# Patient Record
Sex: FEMALE | Race: WHITE | NOT HISPANIC OR LATINO | URBAN - METROPOLITAN AREA
[De-identification: names, ages, dates, MRNs, and addresses within clinical notes are randomized per-mention and may not be internally consistent; named-entity substitution may affect disease eponyms.]

---

## 2017-08-01 ENCOUNTER — EMERGENCY (EMERGENCY)
Facility: HOSPITAL | Age: 67
LOS: 0 days | Discharge: HOME | End: 2017-08-01

## 2017-08-01 DIAGNOSIS — N39.0 URINARY TRACT INFECTION, SITE NOT SPECIFIED: ICD-10-CM

## 2017-08-01 DIAGNOSIS — I10 ESSENTIAL (PRIMARY) HYPERTENSION: ICD-10-CM

## 2017-08-01 DIAGNOSIS — M79.89 OTHER SPECIFIED SOFT TISSUE DISORDERS: ICD-10-CM

## 2017-08-01 DIAGNOSIS — R35.0 FREQUENCY OF MICTURITION: ICD-10-CM

## 2017-08-01 DIAGNOSIS — Z79.899 OTHER LONG TERM (CURRENT) DRUG THERAPY: ICD-10-CM

## 2017-08-31 ENCOUNTER — EMERGENCY (EMERGENCY)
Facility: HOSPITAL | Age: 67
LOS: 0 days | Discharge: HOME | End: 2017-08-31

## 2017-08-31 DIAGNOSIS — L53.9 ERYTHEMATOUS CONDITION, UNSPECIFIED: ICD-10-CM

## 2017-08-31 DIAGNOSIS — I10 ESSENTIAL (PRIMARY) HYPERTENSION: ICD-10-CM

## 2017-08-31 DIAGNOSIS — Z79.899 OTHER LONG TERM (CURRENT) DRUG THERAPY: ICD-10-CM

## 2017-08-31 DIAGNOSIS — E66.01 MORBID (SEVERE) OBESITY DUE TO EXCESS CALORIES: ICD-10-CM

## 2017-08-31 DIAGNOSIS — M79.89 OTHER SPECIFIED SOFT TISSUE DISORDERS: ICD-10-CM

## 2017-09-11 PROBLEM — Z00.00 ENCOUNTER FOR PREVENTIVE HEALTH EXAMINATION: Status: ACTIVE | Noted: 2017-09-11

## 2017-09-13 ENCOUNTER — APPOINTMENT (OUTPATIENT)
Dept: VASCULAR SURGERY | Facility: CLINIC | Age: 67
End: 2017-09-13
Payer: MEDICARE

## 2017-09-13 VITALS
BODY MASS INDEX: 53.92 KG/M2 | WEIGHT: 293 LBS | SYSTOLIC BLOOD PRESSURE: 150 MMHG | DIASTOLIC BLOOD PRESSURE: 90 MMHG | HEIGHT: 62 IN

## 2017-09-13 DIAGNOSIS — Z78.9 OTHER SPECIFIED HEALTH STATUS: ICD-10-CM

## 2017-09-13 DIAGNOSIS — I10 ESSENTIAL (PRIMARY) HYPERTENSION: ICD-10-CM

## 2017-09-13 DIAGNOSIS — R60.0 LOCALIZED EDEMA: ICD-10-CM

## 2017-09-13 PROCEDURE — 99203 OFFICE O/P NEW LOW 30 MIN: CPT

## 2017-09-13 RX ORDER — METOPROLOL SUCCINATE 25 MG/1
25 TABLET, EXTENDED RELEASE ORAL
Refills: 0 | Status: ACTIVE | COMMUNITY

## 2018-02-11 ENCOUNTER — INPATIENT (INPATIENT)
Facility: HOSPITAL | Age: 68
LOS: 2 days | Discharge: HOME | End: 2018-02-14
Attending: HOSPITALIST

## 2018-02-11 VITALS
SYSTOLIC BLOOD PRESSURE: 124 MMHG | HEART RATE: 98 BPM | DIASTOLIC BLOOD PRESSURE: 70 MMHG | RESPIRATION RATE: 19 BRPM | OXYGEN SATURATION: 96 % | TEMPERATURE: 98 F | WEIGHT: 293 LBS | HEIGHT: 62 IN

## 2018-02-11 DIAGNOSIS — J18.9 PNEUMONIA, UNSPECIFIED ORGANISM: ICD-10-CM

## 2018-02-11 DIAGNOSIS — N39.0 URINARY TRACT INFECTION, SITE NOT SPECIFIED: ICD-10-CM

## 2018-02-11 DIAGNOSIS — I10 ESSENTIAL (PRIMARY) HYPERTENSION: ICD-10-CM

## 2018-02-11 LAB
ALBUMIN SERPL ELPH-MCNC: 3.9 G/DL — SIGNIFICANT CHANGE UP (ref 3–5.5)
ALP SERPL-CCNC: 89 U/L — SIGNIFICANT CHANGE UP (ref 30–115)
ALT FLD-CCNC: 15 U/L — SIGNIFICANT CHANGE UP (ref 0–41)
ANION GAP SERPL CALC-SCNC: 10 MMOL/L — SIGNIFICANT CHANGE UP (ref 7–14)
AST SERPL-CCNC: 24 U/L — SIGNIFICANT CHANGE UP (ref 0–41)
BASOPHILS # BLD AUTO: 0.03 K/UL — SIGNIFICANT CHANGE UP (ref 0–0.2)
BASOPHILS NFR BLD AUTO: 0.3 % — SIGNIFICANT CHANGE UP (ref 0–1)
BILIRUB SERPL-MCNC: 1.3 MG/DL — HIGH (ref 0.2–1.2)
BUN SERPL-MCNC: 13 MG/DL — SIGNIFICANT CHANGE UP (ref 10–20)
CALCIUM SERPL-MCNC: 9 MG/DL — SIGNIFICANT CHANGE UP (ref 8.5–10.1)
CHLORIDE SERPL-SCNC: 101 MMOL/L — SIGNIFICANT CHANGE UP (ref 98–110)
CK MB CFR SERPL CALC: 2.2 NG/ML — SIGNIFICANT CHANGE UP (ref 0.6–6.3)
CO2 SERPL-SCNC: 26 MMOL/L — SIGNIFICANT CHANGE UP (ref 17–32)
CREAT SERPL-MCNC: 0.9 MG/DL — SIGNIFICANT CHANGE UP (ref 0.7–1.5)
EOSINOPHIL # BLD AUTO: 0.09 K/UL — SIGNIFICANT CHANGE UP (ref 0–0.7)
EOSINOPHIL NFR BLD AUTO: 0.8 % — SIGNIFICANT CHANGE UP (ref 0–8)
GLUCOSE SERPL-MCNC: 140 MG/DL — HIGH (ref 70–110)
HCT VFR BLD CALC: 41.5 % — SIGNIFICANT CHANGE UP (ref 37–47)
HGB BLD-MCNC: 14.1 G/DL — SIGNIFICANT CHANGE UP (ref 14–18)
IMM GRANULOCYTES NFR BLD AUTO: 0.3 % — SIGNIFICANT CHANGE UP (ref 0.1–0.3)
LACTATE SERPL-SCNC: 1.5 MMOL/L — SIGNIFICANT CHANGE UP (ref 0.5–2.2)
LIDOCAIN IGE QN: 18 U/L — SIGNIFICANT CHANGE UP (ref 7–60)
LYMPHOCYTES # BLD AUTO: 0.82 K/UL — LOW (ref 1.2–3.4)
LYMPHOCYTES # BLD AUTO: 7.1 % — LOW (ref 20.5–51.1)
MCHC RBC-ENTMCNC: 28.9 PG — SIGNIFICANT CHANGE UP (ref 27–31)
MCHC RBC-ENTMCNC: 34 G/DL — SIGNIFICANT CHANGE UP (ref 32–37)
MCV RBC AUTO: 85 FL — SIGNIFICANT CHANGE UP (ref 81–91)
MONOCYTES # BLD AUTO: 0.46 K/UL — SIGNIFICANT CHANGE UP (ref 0.1–0.6)
MONOCYTES NFR BLD AUTO: 4 % — SIGNIFICANT CHANGE UP (ref 1.7–9.3)
NEUTROPHILS # BLD AUTO: 10.13 K/UL — HIGH (ref 1.4–6.5)
NEUTROPHILS NFR BLD AUTO: 87.5 % — HIGH (ref 42.2–75.2)
NRBC # BLD: 0 /100 WBCS — SIGNIFICANT CHANGE UP (ref 0–0)
PLATELET # BLD AUTO: 340 K/UL — SIGNIFICANT CHANGE UP (ref 130–400)
POTASSIUM SERPL-MCNC: 4.1 MMOL/L — SIGNIFICANT CHANGE UP (ref 3.5–5)
POTASSIUM SERPL-SCNC: 4.1 MMOL/L — SIGNIFICANT CHANGE UP (ref 3.5–5)
PROT SERPL-MCNC: 7.4 G/DL — SIGNIFICANT CHANGE UP (ref 6–8)
RBC # BLD: 4.88 M/UL — SIGNIFICANT CHANGE UP (ref 4.2–5.4)
RBC # FLD: 14.7 % — HIGH (ref 11.5–14.5)
SODIUM SERPL-SCNC: 137 MMOL/L — SIGNIFICANT CHANGE UP (ref 135–146)
TROPONIN I SERPL-MCNC: <0.02 NG/ML — SIGNIFICANT CHANGE UP (ref 0–0.05)
WBC # BLD: 11.57 K/UL — HIGH (ref 4.8–10.8)
WBC # FLD AUTO: 11.57 K/UL — HIGH (ref 4.8–10.8)

## 2018-02-11 RX ORDER — ACETAMINOPHEN 500 MG
650 TABLET ORAL ONCE
Qty: 0 | Refills: 0 | Status: COMPLETED | OUTPATIENT
Start: 2018-02-11 | End: 2018-02-11

## 2018-02-11 RX ORDER — AZITHROMYCIN 500 MG/1
500 TABLET, FILM COATED ORAL EVERY 24 HOURS
Qty: 0 | Refills: 0 | Status: DISCONTINUED | OUTPATIENT
Start: 2018-02-11 | End: 2018-02-13

## 2018-02-11 RX ORDER — SODIUM CHLORIDE 9 MG/ML
1000 INJECTION INTRAMUSCULAR; INTRAVENOUS; SUBCUTANEOUS ONCE
Qty: 0 | Refills: 0 | Status: COMPLETED | OUTPATIENT
Start: 2018-02-11 | End: 2018-02-11

## 2018-02-11 RX ORDER — CEFTRIAXONE 500 MG/1
1 INJECTION, POWDER, FOR SOLUTION INTRAMUSCULAR; INTRAVENOUS ONCE
Qty: 0 | Refills: 0 | Status: COMPLETED | OUTPATIENT
Start: 2018-02-11 | End: 2018-02-11

## 2018-02-11 RX ORDER — OXYCODONE AND ACETAMINOPHEN 5; 325 MG/1; MG/1
1 TABLET ORAL EVERY 4 HOURS
Qty: 0 | Refills: 0 | Status: DISCONTINUED | OUTPATIENT
Start: 2018-02-11 | End: 2018-02-14

## 2018-02-11 RX ORDER — AZITHROMYCIN 500 MG/1
500 TABLET, FILM COATED ORAL ONCE
Qty: 0 | Refills: 0 | Status: COMPLETED | OUTPATIENT
Start: 2018-02-11 | End: 2018-02-11

## 2018-02-11 RX ORDER — CEFTRIAXONE 500 MG/1
1 INJECTION, POWDER, FOR SOLUTION INTRAMUSCULAR; INTRAVENOUS EVERY 24 HOURS
Qty: 0 | Refills: 0 | Status: DISCONTINUED | OUTPATIENT
Start: 2018-02-11 | End: 2018-02-13

## 2018-02-11 RX ADMIN — Medication 650 MILLIGRAM(S): at 20:18

## 2018-02-11 NOTE — H&P ADULT - HISTORY OF PRESENT ILLNESS
· Chief Complaint: The patient is a 67y Female complaining of multiple medical complaints.  · HPI Objective Statement: Pt comes in c/o flu like symptoms x 1 week. Pt states that she has been having generalized body aches, back pain, coughing, sneezing and a sore throat for 3 days, went to see her PMD on the 4th day and was started on Tamiflu. Pt states that she is now having increased back pain with pain radiating around to the abdomen. Pt states that she is now experiencing exertional SOB and had 2 episodes of diarrhea. Pt has no documented temperature. Pt states that she was in the ER yesterday with her mom and someone fell on top of her and exacerbated her back pain. Pt denies any CP, nausea, vomiting. · Chief Complaint: The patient is a 67y Female complaining of multiple medical complaints.  · HPI Objective Statement: Pt comes in c/o flu like symptoms x 1 week. Pt states that she has been having generalized body aches, back pain, coughing, sneezing and a sore throat for 3 days, went to see her PMD on the 4th day and was started on Tamiflu. Pt states that she is now having increased back pain with pain radiating around to the abdomen. Pt states that she is now experiencing exertional SOB and had 2 episodes of diarrhea.  Pt states that she was in the ER yesterday with her mom and someone fell on top of her and exacerbated her back pain. Pt denies any CP, nausea, vomiting.

## 2018-02-11 NOTE — ED PROVIDER NOTE - ATTENDING CONTRIBUTION TO CARE
67 y F pw complaint of cough, fevers, and feeling ill x worsening x 1 wk, taking tamiflu though not confirmed to have influenza.   Exam:   A/P: 67 y F pw complaint of cough, fevers, body aches, and feeling ill, worsening x approx 1 wk, taking tamiflu though not confirmed to have influenza. presenting with concern for PNA.  Exam: HEENT nl, OP mild erythema no swelling of oral structures, EOMI, PERRLA 4mm, neck supple nontender, nl ROM, no stepoff, heart tachycardic, lungs left base crackles, chest nontender, back nontender in TLS spine. abd ntnd, ext nontender, nl rom, no deformity. Neuro A&Ox3, normal strength 5/5 all 4 ext, nl sensation throughout, normal speech and coordination, CN II-XII intact.   A/P: Eval for PNA vs other complication of influenza vs upper respiratory infection. Labs, fluids, imaging, abx as indicated by studies, reassess.

## 2018-02-11 NOTE — ED PROVIDER NOTE - OBJECTIVE STATEMENT
Pt comes in c/o flu like symptoms x 1 week. Pt states that she has been having generalized body aches, back pain, coughing, sneezing and a sore throat for 3 days, went to see her PMD on the 4th day and was started on Tamiflu. Pt states that she is now having increased back pain with pain radiating around to the abdomen. Pt states that she is now experiencing exertional SOB and had 2 episodes of diarrhea. Pt has no documented temperature. Pt states that she was in the ER yesterday with her mom and someone fell on top of her and exacerbated her back pain. Pt denies any CP, nausea, vomiting.

## 2018-02-11 NOTE — H&P ADULT - PROBLEM SELECTOR PLAN 3
continue bp meds continue bp meds .pt on lotrel but said to be allergic to amlodipine. pharmacy closed will need( non formulary pt may use own)

## 2018-02-11 NOTE — H&P ADULT - ASSESSMENT
67y Female complaining of multiple medical complaints.  · HPI Objective Statement: Pt comes in c/o flu like symptoms x 1 week. Pt states that she has been having generalized body aches, back pain, coughing, sneezing and a sore throat for 3 days, went to see her PMD on the 4th day and was started on Tamiflu. Pt states that she is now having increased back pain with pain radiating around to the abdomen. Pt states that she is now experiencing exertional SOB and had 2 episodes of diarrhea. Pt has no documented temperature. Pt states that she was in the ER yesterday with her mom and someone fell on top of her and exacerbated her back pain. Pt denies any CP, n 67y Female complaining of multiple medical complaints.  · HPI Objective Statement: Pt comes in c/o flu like symptoms x 1 week. Pt states that she has been having generalized body aches, back pain, coughing, sneezing and a sore throat for 3 days, went to see her PMD on the 4th day and was started on Tamiflu. Pt states that she is now having increased back pain with pain radiating around to the abdomen. Pt states that she is now experiencing exertional SOB and had 2 episodes of diarrhea. Pt has no documented temperature.

## 2018-02-11 NOTE — H&P ADULT - NSHPPHYSICALEXAM_GEN_ALL_CORE
GREG BATRES  67y  Female    Complaint:     T(C): 36.9 (02-11-18 @ 18:02), Max: 36.9 (02-11-18 @ 18:02)  HR: 98 (02-11-18 @ 18:02) (98 - 98)  BP: 124/70 (02-11-18 @ 18:02) (124/70 - 124/70)  RR: 19 (02-11-18 @ 18:02) (19 - 19)  SpO2: 96% (02-11-18 @ 18:02) (96% - 96%)  Wt(kg): --    PHYSICAL EXAM:    NERVOUS SYSTEM:  Alert & Oriented X3,  PULMONARY: Clear to percussion bilaterally; No rales, rhonchi, wheezing, or rubs  CARDIOVASCULAR: Regular rate and rhythm; No murmurs, rubs, or gallops  GI: Soft, Nontender, Nondistended; Bowel sounds present  EXTREMITIES:  2+ Peripheral Pulses, No clubbing, cyanosis, or edema  SKIN: warm and dry    Assesment/Plan

## 2018-02-11 NOTE — ED ADULT TRIAGE NOTE - CHIEF COMPLAINT QUOTE
Pt states "For one week my upper back and stomach has been hurting on and off, I feel like I have the chills and like Monserrat been running a fever, I've been on tamiflu for four days". "Also I have body aches and I feel like I have a uti"

## 2018-02-11 NOTE — ED PROVIDER NOTE - MEDICAL DECISION MAKING DETAILS
Found to have Left sided pneumonia. Also found to have UTI. Will admit with abx started covering both.

## 2018-02-11 NOTE — H&P ADULT - NSHPLABSRESULTS_GEN_ALL_CORE
14.1   11.57 )-----------( 340      ( 11 Feb 2018 19:58 )             41.5       02-11    137  |  101  |  13  ----------------------------<  140<H>  4.1   |  26  |  0.9    Ca    9.0      11 Feb 2018 19:58    TPro  7.4  /  Alb  3.9  /  TBili  1.3<H>  /  DBili  x   /  AST  24  /  ALT  15  /  AlkPhos  89  02-11                      Lactate Trend  02-11 @ 19:58 Lactate:1.5       CARDIAC MARKERS ( 11 Feb 2018 19:58 )  <0.02 ng/mL / x     / x     / x     / 2.2 ng/mL        CAPILLARY BLOOD GLUCOSE

## 2018-02-11 NOTE — H&P ADULT - ATTENDING COMMENTS
pt seen and examined independently. agree with assessment and plan of PA. discussed with PA, will continue antibiotics and reasess

## 2018-02-12 DIAGNOSIS — J06.9 ACUTE UPPER RESPIRATORY INFECTION, UNSPECIFIED: ICD-10-CM

## 2018-02-12 DIAGNOSIS — E66.9 OBESITY, UNSPECIFIED: ICD-10-CM

## 2018-02-12 RX ORDER — LIDOCAINE 4 G/100G
5 CREAM TOPICAL DAILY
Qty: 0 | Refills: 0 | Status: DISCONTINUED | OUTPATIENT
Start: 2018-02-12 | End: 2018-02-13

## 2018-02-12 RX ORDER — ACETAMINOPHEN 500 MG
650 TABLET ORAL ONCE
Qty: 0 | Refills: 0 | Status: COMPLETED | OUTPATIENT
Start: 2018-02-12 | End: 2018-02-12

## 2018-02-12 RX ADMIN — Medication 75 MILLIGRAM(S): at 05:13

## 2018-02-12 RX ADMIN — Medication 75 MILLIGRAM(S): at 17:43

## 2018-02-12 RX ADMIN — CEFTRIAXONE 100 GRAM(S): 500 INJECTION, POWDER, FOR SOLUTION INTRAMUSCULAR; INTRAVENOUS at 01:34

## 2018-02-12 RX ADMIN — CEFTRIAXONE 100 GRAM(S): 500 INJECTION, POWDER, FOR SOLUTION INTRAMUSCULAR; INTRAVENOUS at 02:01

## 2018-02-12 RX ADMIN — AZITHROMYCIN 255 MILLIGRAM(S): 500 TABLET, FILM COATED ORAL at 01:58

## 2018-02-12 RX ADMIN — SODIUM CHLORIDE 1000 MILLILITER(S): 9 INJECTION INTRAMUSCULAR; INTRAVENOUS; SUBCUTANEOUS at 01:49

## 2018-02-12 NOTE — PROGRESS NOTE ADULT - SUBJECTIVE AND OBJECTIVE BOX
Patient is a 67y old  Female who presents with a chief complaint of generalized weakness body ache; dry cough; lower back pain and left knee pain     Vital Signs Last 24 Hrs  T(C): 36.6 (12 Feb 2018 15:03), Max: 38.3 (12 Feb 2018 07:21)  T(F): 97.9 (12 Feb 2018 15:03), Max: 101 (12 Feb 2018 07:21)  HR: 75 (12 Feb 2018 15:03) (75 - 98)  BP: 143/85 (12 Feb 2018 15:03) (124/70 - 143/85)  BP(mean): --  RR: 18 (12 Feb 2018 15:03) (16 - 19)  SpO2: 96% (12 Feb 2018 00:21) (96% - 96%)    PHYSICAL EXAM:  GENERAL: NAD, well-groomed, well-developed; morbidly obese  HEAD:  Atraumatic, Normocephalic  EYES: EOMI, PERRLA, conjunctiva and sclera clear  ENMT: No tonsillar erythema, exudates, or enlargement; Moist mucous membranes, Good dentition, No lesions  NECK: Supple, No JVD, Normal thyroid  NERVOUS SYSTEM:  Alert & Oriented X3, Good concentration; Motor Strength 5/5 B/L upper and lower extremities; DTRs 2+ intact and symmetric  CHEST/LUNG: Clear to percussion bilaterally; No rales, rhonchi, wheezing, or rubs  HEART: Regular rate and rhythm; No murmurs, rubs, or gallops  ABDOMEN: Soft, Nontender, Nondistended; Bowel sounds present  EXTREMITIES:  2+ Peripheral Pulses, No clubbing, cyanosis, 3+ pitting edema  LYMPH: No lymphadenopathy noted  SKIN: No rashes or lesions      LABS:                        14.1   11.57 )-----------( 340      ( 11 Feb 2018 19:58 )             41.5     02-11    137  |  101  |  13  ----------------------------<  140<H>  4.1   |  26  |  0.9    Ca    9.0      11 Feb 2018 19:58    TPro  7.4  /  Alb  3.9  /  TBili  1.3<H>  /  DBili  x   /  AST  24  /  ALT  15  /  AlkPhos  89  02-11          Culture - Urine (collected 11 Feb 2018 20:26)  Source: .Urine Clean Catch (Midstream)  Preliminary Report (12 Feb 2018 17:32):    >100,000 CFU/ml Escherichia coli    RADIOLOGY & ADDITIONAL TESTS:< from: Xray Chest 2 Views PA/Lat (02.11.18 @ 19:31) >  No focal pneumonia.    Linear opacity lingular segment left lung could be related to discoid   atelectatic changes and/or fibrotic changes.    < end of copied text >    MEDICATIONS  (STANDING):  amLODIPine 5 mG/benazepril 20 mG 1 Capsule(s) Oral daily  azithromycin  IVPB 500 milliGRAM(s) IV Intermittent every 24 hours  cefTRIAXone   IVPB 1 Gram(s) IV Intermittent every 24 hours  oseltamivir 75 milliGRAM(s) Oral two times a day    MEDICATIONS  (PRN):  oxyCODONE    5 mG/acetaminophen 325 mG 1 Tablet(s) Oral every 4 hours PRN Severe Pain (7 - 10)

## 2018-02-13 LAB
-  AMIKACIN: SIGNIFICANT CHANGE UP
-  AMPICILLIN/SULBACTAM: SIGNIFICANT CHANGE UP
-  AMPICILLIN: SIGNIFICANT CHANGE UP
-  AZTREONAM: SIGNIFICANT CHANGE UP
-  CEFAZOLIN: SIGNIFICANT CHANGE UP
-  CEFEPIME: SIGNIFICANT CHANGE UP
-  CEFOXITIN: SIGNIFICANT CHANGE UP
-  CEFTAZIDIME: SIGNIFICANT CHANGE UP
-  CEFTRIAXONE: SIGNIFICANT CHANGE UP
-  CIPROFLOXACIN: SIGNIFICANT CHANGE UP
-  ERTAPENEM: SIGNIFICANT CHANGE UP
-  GENTAMICIN: SIGNIFICANT CHANGE UP
-  IMIPENEM: SIGNIFICANT CHANGE UP
-  LEVOFLOXACIN: SIGNIFICANT CHANGE UP
-  MEROPENEM: SIGNIFICANT CHANGE UP
-  NITROFURANTOIN: SIGNIFICANT CHANGE UP
-  PIPERACILLIN/TAZOBACTAM: SIGNIFICANT CHANGE UP
-  TOBRAMYCIN: SIGNIFICANT CHANGE UP
-  TRIMETHOPRIM/SULFAMETHOXAZOLE: SIGNIFICANT CHANGE UP
ALBUMIN SERPL ELPH-MCNC: 3.2 G/DL — SIGNIFICANT CHANGE UP (ref 3–5.5)
ALP SERPL-CCNC: 60 U/L — SIGNIFICANT CHANGE UP (ref 30–115)
ALT FLD-CCNC: 18 U/L — SIGNIFICANT CHANGE UP (ref 0–41)
ANION GAP SERPL CALC-SCNC: 6 MMOL/L — LOW (ref 7–14)
AST SERPL-CCNC: 30 U/L — SIGNIFICANT CHANGE UP (ref 0–41)
BILIRUB SERPL-MCNC: 0.6 MG/DL — SIGNIFICANT CHANGE UP (ref 0.2–1.2)
BUN SERPL-MCNC: 11 MG/DL — SIGNIFICANT CHANGE UP (ref 10–20)
CALCIUM SERPL-MCNC: 8.6 MG/DL — SIGNIFICANT CHANGE UP (ref 8.5–10.1)
CHLORIDE SERPL-SCNC: 105 MMOL/L — SIGNIFICANT CHANGE UP (ref 98–110)
CO2 SERPL-SCNC: 27 MMOL/L — SIGNIFICANT CHANGE UP (ref 17–32)
CREAT SERPL-MCNC: 0.6 MG/DL — LOW (ref 0.7–1.5)
CULTURE RESULTS: SIGNIFICANT CHANGE UP
GLUCOSE SERPL-MCNC: 123 MG/DL — HIGH (ref 70–110)
HCT VFR BLD CALC: 34.6 % — LOW (ref 37–47)
HGB BLD-MCNC: 11.7 G/DL — LOW (ref 14–18)
MAGNESIUM SERPL-MCNC: 1.9 MG/DL — SIGNIFICANT CHANGE UP (ref 1.8–2.4)
MCHC RBC-ENTMCNC: 29.1 PG — SIGNIFICANT CHANGE UP (ref 27–31)
MCHC RBC-ENTMCNC: 33.8 G/DL — SIGNIFICANT CHANGE UP (ref 32–37)
MCV RBC AUTO: 86.1 FL — SIGNIFICANT CHANGE UP (ref 81–91)
METHOD TYPE: SIGNIFICANT CHANGE UP
NRBC # BLD: 0 /100 WBCS — SIGNIFICANT CHANGE UP (ref 0–0)
ORGANISM # SPEC MICROSCOPIC CNT: SIGNIFICANT CHANGE UP
ORGANISM # SPEC MICROSCOPIC CNT: SIGNIFICANT CHANGE UP
PLATELET # BLD AUTO: 247 K/UL — SIGNIFICANT CHANGE UP (ref 130–400)
POTASSIUM SERPL-MCNC: 3.5 MMOL/L — SIGNIFICANT CHANGE UP (ref 3.5–5)
POTASSIUM SERPL-SCNC: 3.5 MMOL/L — SIGNIFICANT CHANGE UP (ref 3.5–5)
PROT SERPL-MCNC: 6.1 G/DL — SIGNIFICANT CHANGE UP (ref 6–8)
RBC # BLD: 4.02 M/UL — LOW (ref 4.2–5.4)
RBC # FLD: 14.6 % — HIGH (ref 11.5–14.5)
SODIUM SERPL-SCNC: 138 MMOL/L — SIGNIFICANT CHANGE UP (ref 135–146)
SPECIMEN SOURCE: SIGNIFICANT CHANGE UP
WBC # BLD: 6.7 K/UL — SIGNIFICANT CHANGE UP (ref 4.8–10.8)
WBC # FLD AUTO: 6.7 K/UL — SIGNIFICANT CHANGE UP (ref 4.8–10.8)

## 2018-02-13 RX ORDER — LIDOCAINE 4 G/100G
2 CREAM TOPICAL DAILY
Qty: 0 | Refills: 0 | Status: DISCONTINUED | OUTPATIENT
Start: 2018-02-13 | End: 2018-02-14

## 2018-02-13 RX ORDER — LACTOBACILLUS ACIDOPHILUS 100MM CELL
1 CAPSULE ORAL
Qty: 0 | Refills: 0 | Status: DISCONTINUED | OUTPATIENT
Start: 2018-02-13 | End: 2018-02-14

## 2018-02-13 RX ORDER — CEPHALEXIN 500 MG
500 CAPSULE ORAL EVERY 12 HOURS
Qty: 0 | Refills: 0 | Status: DISCONTINUED | OUTPATIENT
Start: 2018-02-13 | End: 2018-02-14

## 2018-02-13 RX ORDER — SALICYLIC ACID 0.5 %
1 CLEANSER (GRAM) TOPICAL EVERY 12 HOURS
Qty: 0 | Refills: 0 | Status: DISCONTINUED | OUTPATIENT
Start: 2018-02-13 | End: 2018-02-14

## 2018-02-13 RX ORDER — PANTOPRAZOLE SODIUM 20 MG/1
40 TABLET, DELAYED RELEASE ORAL
Qty: 0 | Refills: 0 | Status: DISCONTINUED | OUTPATIENT
Start: 2018-02-13 | End: 2018-02-14

## 2018-02-13 RX ADMIN — AZITHROMYCIN 255 MILLIGRAM(S): 500 TABLET, FILM COATED ORAL at 11:53

## 2018-02-13 RX ADMIN — Medication 1 TABLET(S): at 18:46

## 2018-02-13 RX ADMIN — Medication 500 MILLIGRAM(S): at 18:45

## 2018-02-13 RX ADMIN — LIDOCAINE 2 PATCH: 4 CREAM TOPICAL at 18:45

## 2018-02-13 RX ADMIN — CEFTRIAXONE 100 GRAM(S): 500 INJECTION, POWDER, FOR SOLUTION INTRAMUSCULAR; INTRAVENOUS at 06:49

## 2018-02-13 RX ADMIN — Medication 30 MILLILITER(S): at 11:54

## 2018-02-13 RX ADMIN — Medication 1 APPLICATION(S): at 20:50

## 2018-02-13 NOTE — PROGRESS NOTE ADULT - SUBJECTIVE AND OBJECTIVE BOX
Patient is a 67y old  Female who presents with a chief complaint of generalized weakness body ache; dry cough; lower back pain and left knee pain     Vital Signs Last 24 Hrs  T(C): 35.6 (13 Feb 2018 14:22), Max: 36.7 (12 Feb 2018 21:02)  T(F): 96 (13 Feb 2018 14:22), Max: 98.1 (12 Feb 2018 21:02)  HR: 83 (13 Feb 2018 14:22) (68 - 110)  BP: 121/85 (13 Feb 2018 14:22) (107/59 - 140/70)  BP(mean): --  RR: 16 (13 Feb 2018 14:22) (16 - 16)  SpO2: --  PHYSICAL EXAM:  GENERAL: NAD, well-groomed, well-developed; morbidly obese  HEAD:  Atraumatic, Normocephalic  EYES: EOMI, PERRLA, conjunctiva and sclera clear  ENMT: No tonsillar erythema, exudates, or enlargement; Moist mucous membranes, Good dentition, No lesions  NECK: Supple, No JVD, Normal thyroid  NERVOUS SYSTEM:  Alert & Oriented X3, Good concentration; Motor Strength 5/5 B/L upper and lower extremities; DTRs 2+ intact and symmetric  CHEST/LUNG: Clear to percussion bilaterally; No rales, rhonchi, wheezing, or rubs  HEART: Regular rate and rhythm; No murmurs, rubs, or gallops  ABDOMEN: Soft, Nontender, Nondistended; Bowel sounds present  EXTREMITIES:  2+ Peripheral Pulses, No clubbing, cyanosis, 3+ pitting edema  LYMPH: No lymphadenopathy noted  SKIN: No rashes or lesions      LABS:                                       11.7   6.70  )-----------( 247      ( 13 Feb 2018 07:37 )             34.6     02-13    138  |  105  |  11  ----------------------------<  123<H>  3.5   |  27  |  0.6<L>    Ca    8.6      13 Feb 2018 07:37  Mg     1.9     02-13    TPro  6.1  /  Alb  3.2  /  TBili  0.6  /  DBili  x   /  AST  30  /  ALT  18  /  AlkPhos  60  02-13        Culture - Urine (collected 11 Feb 2018 20:26)  Source: .Urine Clean Catch (Midstream)  Preliminary Report (12 Feb 2018 17:32):    >100,000 CFU/ml Escherichia coli    RADIOLOGY & ADDITIONAL TESTS:< from: Xray Chest 2 Views PA/Lat (02.11.18 @ 19:31) >  No focal pneumonia.    Linear opacity lingular segment left lung could be related to discoid   atelectatic changes and/or fibrotic changes.    < end of copied text >    MEDICATIONS  (STANDING):  amLODIPine 5 mG/benazepril 20 mG 1 Capsule(s) Oral daily  cephalexin 500 milliGRAM(s) Oral every 12 hours  lactobacillus acidophilus 1 Tablet(s) Oral two times a day with meals  lidocaine   Patch 2 Patch Transdermal daily  pantoprazole    Tablet 40 milliGRAM(s) Oral before breakfast    MEDICATIONS  (PRN):  aluminum hydroxide/magnesium hydroxide/simethicone Suspension 30 milliLiter(s) Oral every 4 hours PRN Dyspepsia  oxyCODONE    5 mG/acetaminophen 325 mG 1 Tablet(s) Oral every 4 hours PRN Severe Pain (7 - 10)

## 2018-02-14 ENCOUNTER — TRANSCRIPTION ENCOUNTER (OUTPATIENT)
Age: 68
End: 2018-02-14

## 2018-02-14 VITALS
HEART RATE: 70 BPM | TEMPERATURE: 98 F | DIASTOLIC BLOOD PRESSURE: 56 MMHG | SYSTOLIC BLOOD PRESSURE: 124 MMHG | RESPIRATION RATE: 16 BRPM

## 2018-02-14 RX ORDER — CEPHALEXIN 500 MG
500 CAPSULE ORAL
Qty: 8 | Refills: 0
Start: 2018-02-14 | End: 2018-02-17

## 2018-02-14 RX ORDER — LACTOBACILLUS ACIDOPHILUS 100MM CELL
0 CAPSULE ORAL
Qty: 0 | Refills: 0 | DISCHARGE
Start: 2018-02-14

## 2018-02-14 RX ADMIN — Medication 1 TABLET(S): at 16:20

## 2018-02-14 RX ADMIN — Medication 500 MILLIGRAM(S): at 16:19

## 2018-02-14 RX ADMIN — LIDOCAINE 2 PATCH: 4 CREAM TOPICAL at 05:32

## 2018-02-14 RX ADMIN — LIDOCAINE 2 PATCH: 4 CREAM TOPICAL at 13:35

## 2018-02-14 RX ADMIN — Medication 1 TABLET(S): at 08:13

## 2018-02-14 RX ADMIN — Medication 500 MILLIGRAM(S): at 05:34

## 2018-02-14 RX ADMIN — Medication 1 APPLICATION(S): at 05:34

## 2018-02-14 RX ADMIN — Medication 1 APPLICATION(S): at 16:19

## 2018-02-14 RX ADMIN — PANTOPRAZOLE SODIUM 40 MILLIGRAM(S): 20 TABLET, DELAYED RELEASE ORAL at 05:37

## 2018-02-14 NOTE — DISCHARGE NOTE ADULT - PATIENT PORTAL LINK FT
You can access the [facility] Portal, offered by [facility], by registering with the following website: [email or web address]

## 2018-02-14 NOTE — DISCHARGE NOTE ADULT - HOSPITAL COURSE
Pt was admitted after she visited her mother in the hospital and felt sick. On admission she c/o generalized weakness lower back and knee pain. She was found to have UTI; initially treated with IV ABX. Urine Cx came back positive for E. coli ; pt refused IV lock and was treated with po Keflex. She was getting pain meds PRN. Leukocytosis resolved.   Pt was able to ambulate; was seen and examined today ; stable for d/c home Pt was admitted after she visited her mother in the hospital and felt sick. On admission she c/o generalized weakness lower back and knee pain. She was found to have UTI; initially treated with IV ABX. Urine Cx came back positive for E. coli ; pt refused IV lock and was treated with po Keflex. She was getting pain meds PRN. Leukocytosis resolved.   Pt was able to ambulate; was seen and examined today ; stable for d/c home.  Spent 60min

## 2018-02-14 NOTE — DISCHARGE NOTE ADULT - REASON FOR ADMISSION
pt was admitted with multiple complaints: generalized weakness; lower back pain ; b/l knee pain ; she was unable to walk; was found to have UTI

## 2018-02-14 NOTE — DISCHARGE NOTE ADULT - CARE PLAN
Principal Discharge DX:	UTI (urinary tract infection)  Goal:	maintain sufficient hydration  Assessment and plan of treatment:	complete course of Abx  Secondary Diagnosis:	Obesity  Goal:	weight loss  Assessment and plan of treatment:	walk every day for 30 min; reduce calories in diet  Secondary Diagnosis:	Hypertension, unspecified type  Assessment and plan of treatment:	low sodium diet; c/w home meds a  Secondary Diagnosis:	Viral upper respiratory tract infection  Assessment and plan of treatment:	avoid sick contacts; take Flu shot every year

## 2018-02-14 NOTE — DISCHARGE NOTE ADULT - MEDICATION SUMMARY - MEDICATIONS TO TAKE
I will START or STAY ON the medications listed below when I get home from the hospital:    aluminum hydroxide-magnesium hydroxide 200 mg-200 mg/5 mL oral suspension  -- 30 milliliter(s) by mouth every 4 hours, As needed, Dyspepsia  -- Indication: For GI upset    Lotrel 5 mg-20 mg oral capsule  -- 1 cap(s) by mouth once a day  -- Indication: For Hypertension, unspecified type    cephalexin 500 mg oral capsule  -- 500 cap(s) by mouth every 12 hours   -- Indication: For UTI (urinary tract infection)    Salonpas 0.025%-1.25% topical film  -- Apply on skin to affected area 3 times a day; apply to knees    -- Indication: For OA    vitamin A & D topical ointment  -- 1 application on skin every 12 hours  -- Indication: For dry skin    lactobacillus acidophilus oral capsule  -- tab(s) by mouth 3 times a day  -- Indication: For GI

## 2018-02-14 NOTE — DISCHARGE NOTE ADULT - PLAN OF CARE
maintain sufficient hydration complete course of Abx weight loss walk every day for 30 min; reduce calories in diet low sodium diet; c/w home meds a avoid sick contacts; take Flu shot every year

## 2018-02-16 DIAGNOSIS — D72.829 ELEVATED WHITE BLOOD CELL COUNT, UNSPECIFIED: ICD-10-CM

## 2018-02-16 DIAGNOSIS — N39.0 URINARY TRACT INFECTION, SITE NOT SPECIFIED: ICD-10-CM

## 2018-02-16 DIAGNOSIS — J06.9 ACUTE UPPER RESPIRATORY INFECTION, UNSPECIFIED: ICD-10-CM

## 2018-02-16 DIAGNOSIS — J18.9 PNEUMONIA, UNSPECIFIED ORGANISM: ICD-10-CM

## 2018-02-16 DIAGNOSIS — B96.20 UNSPECIFIED ESCHERICHIA COLI [E. COLI] AS THE CAUSE OF DISEASES CLASSIFIED ELSEWHERE: ICD-10-CM

## 2018-02-16 DIAGNOSIS — E66.01 MORBID (SEVERE) OBESITY DUE TO EXCESS CALORIES: ICD-10-CM

## 2018-02-16 DIAGNOSIS — I10 ESSENTIAL (PRIMARY) HYPERTENSION: ICD-10-CM

## 2018-02-16 DIAGNOSIS — Z87.891 PERSONAL HISTORY OF NICOTINE DEPENDENCE: ICD-10-CM

## 2018-02-17 LAB
CULTURE RESULTS: SIGNIFICANT CHANGE UP
SPECIMEN SOURCE: SIGNIFICANT CHANGE UP

## 2018-04-28 NOTE — PROGRESS NOTE ADULT - ASSESSMENT
Pt has multiple complaints including lower back ; left knee pain. Pt also c/o dry cough chest congestion.
Pt has multiple complaints including lower back ; left knee pain. Pt also c/o dry cough chest congestion.
Yes

## 2018-05-03 ENCOUNTER — EMERGENCY (EMERGENCY)
Facility: HOSPITAL | Age: 68
LOS: 0 days | Discharge: HOME | End: 2018-05-03
Attending: EMERGENCY MEDICINE | Admitting: EMERGENCY MEDICINE

## 2018-05-03 VITALS
TEMPERATURE: 96 F | DIASTOLIC BLOOD PRESSURE: 84 MMHG | HEIGHT: 62 IN | OXYGEN SATURATION: 95 % | HEART RATE: 65 BPM | SYSTOLIC BLOOD PRESSURE: 156 MMHG | RESPIRATION RATE: 18 BRPM | WEIGHT: 285.06 LBS

## 2018-05-03 DIAGNOSIS — Z79.2 LONG TERM (CURRENT) USE OF ANTIBIOTICS: ICD-10-CM
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
## 2018-05-03 DIAGNOSIS — Z79.899 OTHER LONG TERM (CURRENT) DRUG THERAPY: ICD-10-CM
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
## 2018-05-03 DIAGNOSIS — R51 HEADACHE: ICD-10-CM

## 2018-05-03 DIAGNOSIS — I10 ESSENTIAL (PRIMARY) HYPERTENSION: ICD-10-CM
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
## 2018-05-03 DIAGNOSIS — S09.90XA UNSPECIFIED INJURY OF HEAD, INITIAL ENCOUNTER: ICD-10-CM

## 2018-05-03 RX ORDER — ACETAMINOPHEN 500 MG
975 TABLET ORAL ONCE
Qty: 0 | Refills: 0 | Status: COMPLETED | OUTPATIENT
Start: 2018-05-03 | End: 2018-05-03

## 2018-05-03 RX ADMIN — Medication 975 MILLIGRAM(S): at 14:25

## 2018-05-03 NOTE — ED PROVIDER NOTE - NS ED ROS FT
Constitutional: No fevers/chills.    Head: No injury, (+) headache.    Eyes:  No visual changes, eye pain or discharge. No injury.    ENMT:  No hearing changes, pain, discharge or infections. No neck pain or stiffness. No loss ROM.    Cardiac:  No chest pain, SOB or edema. No chest pain with exertion.    Respiratory:  No cough or respiratory distress.     GI:  No nausea, vomiting, diarrhea or abdominal pain. No anorexia. No change in PO intake.    :  No dysuria, frequency, urgency or burning. No change in urine output.    MS:  No myalgia, muscle weakness, joint pain or back pain. No loss ROM.    Neuro:  No dizziness or weakness.  No LOC.    Skin:  No skin rash.

## 2018-05-03 NOTE — ED PROVIDER NOTE - PHYSICAL EXAMINATION
GEN: Well appearing, in no apparent distress.    HEAD:  Normocephalic, atraumatic.    EYES:  Clear conjunctivae without injection, drainage or discharge.    ENMT:  Nasal mucosa moist; mouth moist without ulcerations or lesions; throat moist without erythema, exudate, ulcerations or lesions.    NECK:  Supple, no masses. Normal ROM.    CARDIAC:  RRR, normal S1 and S2, no murmurs, rubs or gallops.    RESP:  Respiratory rate and effort appear normal; lungs are clear to auscultation bilaterally; no rhonchi, rales or wheezes.    ABDOMEN:  Soft, non-tender, non-distended, no masses. Normal BS throughout.    MUSCULOSKELETAL/NEURO:  AAO x 3. Motor 5/5. Sensory intact. CN II – XII intact. Patient able to ambulate without difficulty.    SKIN:  Normal skin color for age and race, well-perfused; warm and dry.

## 2018-05-03 NOTE — ED PROVIDER NOTE - OBJECTIVE STATEMENT
68 yo female with PMHx HTN presents for headache intermittent for last week. Patient states she was basement and bent down to  laundry and stood up and hit top of head on ceiling. Since then patient has been having intermittent headaches. Patient denies fevers, chest pain, SOB, abdominal pain, nausea, vomiting, diarrhea, constipation, dizziness, weakness, recent travel, changes in PO intake, changes in urine output, changes in mental status.

## 2018-05-03 NOTE — ED PROVIDER NOTE - ATTENDING CONTRIBUTION TO CARE
I personally evaluated the patient. I reviewed the Resident’s or Physician Assistant’s note (as assigned above), and agree with the findings and plan except as documented in my note.  Pt with headache after minor trauma as above, perrla neuro intact, neck supple, imaging reviewed, will d/c

## 2019-09-25 NOTE — ED PROVIDER NOTE - CARE PLAN
Per Dr Michelle sanz to order Mammogram for patient Principal Discharge DX:	Injury of head, initial encounter  Secondary Diagnosis:	Headache

## 2019-12-28 ENCOUNTER — EMERGENCY (EMERGENCY)
Facility: HOSPITAL | Age: 69
LOS: 0 days | Discharge: HOME | End: 2019-12-28
Attending: EMERGENCY MEDICINE | Admitting: EMERGENCY MEDICINE
Payer: MEDICARE

## 2019-12-28 VITALS
OXYGEN SATURATION: 100 % | DIASTOLIC BLOOD PRESSURE: 83 MMHG | TEMPERATURE: 97 F | HEART RATE: 80 BPM | SYSTOLIC BLOOD PRESSURE: 108 MMHG | RESPIRATION RATE: 18 BRPM

## 2019-12-28 VITALS
DIASTOLIC BLOOD PRESSURE: 66 MMHG | HEART RATE: 84 BPM | RESPIRATION RATE: 19 BRPM | WEIGHT: 250 LBS | SYSTOLIC BLOOD PRESSURE: 128 MMHG | OXYGEN SATURATION: 98 % | TEMPERATURE: 97 F

## 2019-12-28 DIAGNOSIS — N39.0 URINARY TRACT INFECTION, SITE NOT SPECIFIED: ICD-10-CM

## 2019-12-28 DIAGNOSIS — Z88.8 ALLERGY STATUS TO OTHER DRUGS, MEDICAMENTS AND BIOLOGICAL SUBSTANCES STATUS: ICD-10-CM

## 2019-12-28 DIAGNOSIS — M79.652 PAIN IN LEFT THIGH: ICD-10-CM

## 2019-12-28 DIAGNOSIS — N32.89 OTHER SPECIFIED DISORDERS OF BLADDER: ICD-10-CM

## 2019-12-28 DIAGNOSIS — I10 ESSENTIAL (PRIMARY) HYPERTENSION: ICD-10-CM

## 2019-12-28 DIAGNOSIS — M79.651 PAIN IN RIGHT THIGH: ICD-10-CM

## 2019-12-28 LAB
ALBUMIN SERPL ELPH-MCNC: 3.6 G/DL — SIGNIFICANT CHANGE UP (ref 3.5–5.2)
ALP SERPL-CCNC: 71 U/L — SIGNIFICANT CHANGE UP (ref 30–115)
ALT FLD-CCNC: 21 U/L — SIGNIFICANT CHANGE UP (ref 0–41)
ANION GAP SERPL CALC-SCNC: 12 MMOL/L — SIGNIFICANT CHANGE UP (ref 7–14)
APPEARANCE UR: ABNORMAL
AST SERPL-CCNC: 42 U/L — HIGH (ref 0–41)
BACTERIA # UR AUTO: ABNORMAL
BASOPHILS # BLD AUTO: 0.03 K/UL — SIGNIFICANT CHANGE UP (ref 0–0.2)
BASOPHILS NFR BLD AUTO: 0.4 % — SIGNIFICANT CHANGE UP (ref 0–1)
BILIRUB SERPL-MCNC: 0.6 MG/DL — SIGNIFICANT CHANGE UP (ref 0.2–1.2)
BILIRUB UR-MCNC: NEGATIVE — SIGNIFICANT CHANGE UP
BUN SERPL-MCNC: 20 MG/DL — SIGNIFICANT CHANGE UP (ref 10–20)
CALCIUM SERPL-MCNC: 9.2 MG/DL — SIGNIFICANT CHANGE UP (ref 8.5–10.1)
CHLORIDE SERPL-SCNC: 102 MMOL/L — SIGNIFICANT CHANGE UP (ref 98–110)
CO2 SERPL-SCNC: 22 MMOL/L — SIGNIFICANT CHANGE UP (ref 17–32)
COLOR SPEC: YELLOW — SIGNIFICANT CHANGE UP
CREAT SERPL-MCNC: 1 MG/DL — SIGNIFICANT CHANGE UP (ref 0.7–1.5)
DIFF PNL FLD: NEGATIVE — SIGNIFICANT CHANGE UP
EOSINOPHIL # BLD AUTO: 0.4 K/UL — SIGNIFICANT CHANGE UP (ref 0–0.7)
EOSINOPHIL NFR BLD AUTO: 5.2 % — SIGNIFICANT CHANGE UP (ref 0–8)
EPI CELLS # UR: ABNORMAL /HPF
GLUCOSE SERPL-MCNC: 117 MG/DL — HIGH (ref 70–99)
GLUCOSE UR QL: NEGATIVE MG/DL — SIGNIFICANT CHANGE UP
HCT VFR BLD CALC: 39.7 % — SIGNIFICANT CHANGE UP (ref 37–47)
HGB BLD-MCNC: 13.2 G/DL — SIGNIFICANT CHANGE UP (ref 12–16)
IMM GRANULOCYTES NFR BLD AUTO: 0.5 % — HIGH (ref 0.1–0.3)
KETONES UR-MCNC: NEGATIVE — SIGNIFICANT CHANGE UP
LEUKOCYTE ESTERASE UR-ACNC: ABNORMAL
LYMPHOCYTES # BLD AUTO: 0.92 K/UL — LOW (ref 1.2–3.4)
LYMPHOCYTES # BLD AUTO: 11.9 % — LOW (ref 20.5–51.1)
MAGNESIUM SERPL-MCNC: 2 MG/DL — SIGNIFICANT CHANGE UP (ref 1.8–2.4)
MCHC RBC-ENTMCNC: 28.8 PG — SIGNIFICANT CHANGE UP (ref 27–31)
MCHC RBC-ENTMCNC: 33.2 G/DL — SIGNIFICANT CHANGE UP (ref 32–37)
MCV RBC AUTO: 86.7 FL — SIGNIFICANT CHANGE UP (ref 81–99)
MONOCYTES # BLD AUTO: 0.4 K/UL — SIGNIFICANT CHANGE UP (ref 0.1–0.6)
MONOCYTES NFR BLD AUTO: 5.2 % — SIGNIFICANT CHANGE UP (ref 1.7–9.3)
NEUTROPHILS # BLD AUTO: 5.95 K/UL — SIGNIFICANT CHANGE UP (ref 1.4–6.5)
NEUTROPHILS NFR BLD AUTO: 76.8 % — HIGH (ref 42.2–75.2)
NITRITE UR-MCNC: POSITIVE
NRBC # BLD: 0 /100 WBCS — SIGNIFICANT CHANGE UP (ref 0–0)
PH UR: 5.5 — SIGNIFICANT CHANGE UP (ref 5–8)
PLATELET # BLD AUTO: 332 K/UL — SIGNIFICANT CHANGE UP (ref 130–400)
POTASSIUM SERPL-MCNC: 5.1 MMOL/L — HIGH (ref 3.5–5)
POTASSIUM SERPL-SCNC: 5.1 MMOL/L — HIGH (ref 3.5–5)
PROT SERPL-MCNC: 6.6 G/DL — SIGNIFICANT CHANGE UP (ref 6–8)
PROT UR-MCNC: NEGATIVE MG/DL — SIGNIFICANT CHANGE UP
RBC # BLD: 4.58 M/UL — SIGNIFICANT CHANGE UP (ref 4.2–5.4)
RBC # FLD: 14.3 % — SIGNIFICANT CHANGE UP (ref 11.5–14.5)
SODIUM SERPL-SCNC: 136 MMOL/L — SIGNIFICANT CHANGE UP (ref 135–146)
SP GR SPEC: 1.01 — SIGNIFICANT CHANGE UP (ref 1.01–1.03)
UROBILINOGEN FLD QL: 0.2 MG/DL — SIGNIFICANT CHANGE UP (ref 0.2–0.2)
WBC # BLD: 7.74 K/UL — SIGNIFICANT CHANGE UP (ref 4.8–10.8)
WBC # FLD AUTO: 7.74 K/UL — SIGNIFICANT CHANGE UP (ref 4.8–10.8)
WBC UR QL: ABNORMAL /HPF

## 2019-12-28 PROCEDURE — 99284 EMERGENCY DEPT VISIT MOD MDM: CPT

## 2019-12-28 PROCEDURE — 70450 CT HEAD/BRAIN W/O DYE: CPT | Mod: 26

## 2019-12-28 PROCEDURE — 71046 X-RAY EXAM CHEST 2 VIEWS: CPT | Mod: 26

## 2019-12-28 RX ORDER — CEFPODOXIME PROXETIL 100 MG
1 TABLET ORAL
Qty: 14 | Refills: 0
Start: 2019-12-28 | End: 2020-01-03

## 2019-12-28 RX ORDER — MENTHOL AND CAPSAICIN .0375; 5 G/100G; G/100G
1 PATCH TOPICAL
Qty: 0 | Refills: 0 | DISCHARGE

## 2019-12-28 RX ORDER — AMLODIPINE BESYLATE AND BENAZEPRIL HYDROCHLORIDE 10; 20 MG/1; MG/1
1 CAPSULE ORAL
Qty: 0 | Refills: 0 | DISCHARGE

## 2019-12-28 NOTE — ED PROVIDER NOTE - ATTENDING CONTRIBUTION TO CARE
Pt with b/l lateral upper leg pain, intermittent, worse at night, with multiple other vague complaints, also with persistent burning on urination, on exam well appearing, obese, conj pink mmm neck supple cor reg lungs cta abd +bs, snt/nd, no cvat, no external vaginal lesions, legs nontender no rash, calves nontender pulses equal neuro intact, labs and studies reviewed, will d/c on abx to f/u with pmd. Patient counseled regarding conditions which should prompt return.

## 2019-12-28 NOTE — ED PROVIDER NOTE - PATIENT PORTAL LINK FT
You can access the FollowMyHealth Patient Portal offered by [facility] by registering at the following website: [email or web address]. By joining [employer]’s FollowMyHealth portal, you will also be able to view your health information using other applications (apps) compatible with our system.

## 2019-12-28 NOTE — ED PROVIDER NOTE - OBJECTIVE STATEMENT
68 y/o female presents to the ed with dysuria and myalgias x 1 week. patient seen in the ED and rx keflex and AZO but is unrelieved of symptoms. patient deneis any gross hematuria, back pain. patient denies any vomiting or diarrhea. patient with good PO intake. patient c/o weakness and unsteady gait. patient denies any visual changes, headaches, neck pain. patient without any CP, SOB

## 2019-12-28 NOTE — ED ADULT TRIAGE NOTE - CHIEF COMPLAINT QUOTE
muscle aches and burning upon urination started on Keflex. Pt also states when she stands she feels unsteady

## 2021-06-29 NOTE — PATIENT PROFILE ADULT. - PRO PAIN EXPRESSION
verbalization Alar Island Pedicle Flap Text: The defect edges were debeveled with a #15 scalpel blade.  Given the location of the defect, shape of the defect and the proximity to the alar rim an island pedicle advancement flap was deemed most appropriate.  Using a sterile surgical marker, an appropriate advancement flap was drawn incorporating the defect, outlining the appropriate donor tissue and placing the expected incisions within the nasal ala running parallel to the alar rim. The area thus outlined was incised with a #15 scalpel blade.  The skin margins were undermined minimally to an appropriate distance in all directions around the primary defect and laterally outward around the island pedicle utilizing iris scissors.  There was minimal undermining beneath the pedicle flap.

## 2022-06-01 NOTE — ED PROVIDER NOTE - SECONDARY DIAGNOSIS.
Post-Anesthesia Evaluation and Assessment    Patient: Pipe Alberto MRN: [id number]  SSN: [id number]    YOB: 1951  Age: 65 y.o. Sex: female      I have evaluated the patient and they are stable and ready for discharge from the PACU. Cardiovascular Function/Vital Signs  Visit Vitals  /68   Pulse 70   Temp 36.3 °C (97.4 °F)   Resp 17   Ht 5' 2\" (1.575 m)   Wt 72.1 kg (159 lb)   SpO2 97%   Breastfeeding No   BMI 29.08 kg/m²       Patient is status post MAC anesthesia for Procedure(s):  COLONOSCOPY. Nausea/Vomiting: None    Postoperative hydration reviewed and adequate. Pain:  Pain Scale 1: Numeric (0 - 10) (06/01/22 1645)  Pain Intensity 1: 0 (06/01/22 1645)   Managed    Neurological Status: At baseline    Mental Status, Level of Consciousness: Alert and  oriented to person, place, and time    Pulmonary Status:   O2 Device: None (Room air) (06/01/22 1645)   Adequate oxygenation and airway patent    Complications related to anesthesia: None    Post-anesthesia assessment completed. No concerns.      Signed By: Ivelisse Mathis DO     June 1, 2022
UTI (urinary tract infection)

## 2023-04-17 NOTE — H&P ADULT - PROBLEM SELECTOR PROBLEM 1
Call placed to patient who states understanding  Of the results below and has no further questions at this time she requested to be transferred to cardiology.         ----- Message from Lolis Wang MD sent at 4/17/2023  7:45 AM CDT -----  Echocardiogram looks clear but does show elevated right ventricular pressure is.  She should follow-up in Cardiology.  Pressures.  She should follow up in Cardiology    
Pneumonia of both upper lobes due to infectious organism